# Patient Record
Sex: MALE | Race: WHITE | NOT HISPANIC OR LATINO | URBAN - METROPOLITAN AREA
[De-identification: names, ages, dates, MRNs, and addresses within clinical notes are randomized per-mention and may not be internally consistent; named-entity substitution may affect disease eponyms.]

---

## 2024-08-23 ENCOUNTER — HOSPITAL ENCOUNTER (OUTPATIENT)
Dept: RADIOLOGY | Facility: HOSPITAL | Age: 44
Discharge: HOME | End: 2024-08-23
Payer: COMMERCIAL

## 2024-08-23 DIAGNOSIS — M25.511 PAIN IN RIGHT SHOULDER: ICD-10-CM

## 2024-08-23 PROCEDURE — 73030 X-RAY EXAM OF SHOULDER: CPT | Mod: RT

## 2024-09-16 ENCOUNTER — APPOINTMENT (OUTPATIENT)
Dept: ORTHOPEDIC SURGERY | Facility: HOSPITAL | Age: 44
End: 2024-09-16
Payer: COMMERCIAL

## 2024-09-18 ENCOUNTER — APPOINTMENT (OUTPATIENT)
Dept: ORTHOPEDIC SURGERY | Facility: CLINIC | Age: 44
End: 2024-09-18
Payer: COMMERCIAL

## 2024-09-18 DIAGNOSIS — M75.81 ROTATOR CUFF TENDINITIS, RIGHT: Primary | ICD-10-CM

## 2024-09-18 DIAGNOSIS — M75.51 BURSITIS OF RIGHT SHOULDER: ICD-10-CM

## 2024-09-18 DIAGNOSIS — M75.41 IMPINGEMENT SYNDROME OF RIGHT SHOULDER: ICD-10-CM

## 2024-09-18 PROCEDURE — 99204 OFFICE O/P NEW MOD 45 MIN: CPT | Performed by: ORTHOPAEDIC SURGERY

## 2024-09-18 NOTE — PROGRESS NOTES
New patient Right shoulder pain x 1 year, original injury was he had his arms above his head pushing wire through a pipe and has had pain and minimal mobility since. Treatment: 40mg Kenalog injection done by his pcp 3 weeks ago

## 2024-09-18 NOTE — PROGRESS NOTES
43 y.o. male presents today for evaluation of right shoulder pain for about a year.. The patient reports he was passing some wire through type and he started developing right shoulder pain.  He recently got an injection from his primary care which he states has helped a lot. Pain is controlled. Patient reports no numbness and tingling.  Reports no previous surgeries or trauma to the area.  Reports pain worse with use, better at rest.   Pain dull ache, sharp at times prior to the injection, but much improved now.  Complains mainly of decreased range of motion of the shoulder minimal pain    Review of Systems    Constitutional: see HPI, no fever, no chills, not feeling tired, no significant weight gain or weight loss.   Eyes: No vision changes  ENT: no nosebleeds.   Cardiovascular: no chest pain.   Respiratory: no shortness of breath and no cough.   Gastrointestinal: no abdominal pain, no nausea, no vomiting and no diarrhea.   Musculoskeletal: per HPI  Neurological: no headache, no gait disturbances  Psychiatric: no depression and no sleep disturbances.   Endocrine: no muscle weakness and no muscle cramps.   Hematologic/Lymphatic: no swollen glands and no tendency for easy bruising or excessive swelling.     Patient's past medical history, past surgical history, allergies, and medications have been reviewed unless otherwise noted in the chart.     Shoulder:  Inspection:  no evidence of infection, no erythema, no edema, no ecchymosis, Palpation:  compartments are soft  Skin:  intact, Vascular:  capillary refill <2 seconds distally, Sensation:  sensation intact to light touch distally; AROM- Abduction 90, elevation to 165 degrees, ER 90 degrees, IR S1;  NTTP at the biceps tendon,  NTTP at AC joint; NTTP on the lateral deltoid Special-  negative Brown test, Neer's Test, cross body test; negative Empty can test/Drop Arm test;  negative Yergason's/Speed's Test;   negative belly pressed and posterior liftoff      Constitutional   General appearance: Alert and in no acute distress. Well developed, well nourished.    Eyes   External Eye, Conjunctiva and lids: Normal external exam - pupils were equal in size, round, reactive to light (PERRL) with normal accommodation and extraocular movements intact (EOMI).   Ears, Nose, Mouth, and Throat   Hearing: Normal.   Neck   Neck: No neck mass was observed. Supple.   Pulmonary   Respiratory effort: No respiratory distress.   Cardiovascular   Examination of extremities: No peripheral edema.   Psychiatric   Judgment and insight: Intact.   Orientation to person, place, and time: Alert and oriented x 3.       Mood and affect: Normal.      XR - no fractures, no dislocations, or no osseous abnormalities right shoulder    X-rays were personally reviewed by me. Radiology reports were reviewed by me as well, if available at the time.      Right shoulder rotator cuff tendonitis, bursitis and impingement syndrome  Based on the history, physical exam and imaging studies above, the patient's presentation is consistent with the above diagnosis.  I had a long discussion with the patient regarding their presentation and the treatment options.  We discussed initial nonoperative versus operative management options as well as potential further diagnostic imaging.  We again discussed her treatment options going forward along with their associated risks and benefits. After thorough discussion, the patient has elected to proceed with conservative management. All questions were answered to the patients satisfaction who seems satisfied with the plan.  They will call the office with any questions/concerns.    Physical therapy for right shoulder (ROM, strength, etc)  OTC NSAIDs prn  FU 6-8 weeks prn - No XR

## 2024-11-08 ENCOUNTER — APPOINTMENT (OUTPATIENT)
Dept: CARDIOLOGY | Facility: HOSPITAL | Age: 44
End: 2024-11-08
Payer: COMMERCIAL

## 2024-12-03 ENCOUNTER — HOSPITAL ENCOUNTER (OUTPATIENT)
Dept: CARDIOLOGY | Facility: HOSPITAL | Age: 44
Discharge: HOME | End: 2024-12-03
Payer: COMMERCIAL

## 2024-12-03 DIAGNOSIS — R06.09 OTHER FORMS OF DYSPNEA: ICD-10-CM

## 2024-12-03 DIAGNOSIS — R07.89 OTHER CHEST PAIN: ICD-10-CM

## 2024-12-03 PROCEDURE — 93017 CV STRESS TEST TRACING ONLY: CPT

## 2024-12-03 PROCEDURE — 93018 CV STRESS TEST I&R ONLY: CPT | Performed by: INTERNAL MEDICINE

## 2024-12-03 PROCEDURE — 93016 CV STRESS TEST SUPVJ ONLY: CPT | Performed by: INTERNAL MEDICINE

## 2025-03-29 ENCOUNTER — APPOINTMENT (OUTPATIENT)
Dept: RADIOLOGY | Facility: HOSPITAL | Age: 45
End: 2025-03-29
Payer: COMMERCIAL

## 2025-04-12 ENCOUNTER — APPOINTMENT (OUTPATIENT)
Dept: RADIOLOGY | Facility: HOSPITAL | Age: 45
End: 2025-04-12
Payer: COMMERCIAL

## 2025-07-24 ENCOUNTER — HOSPITAL ENCOUNTER (EMERGENCY)
Facility: HOSPITAL | Age: 45
Discharge: HOME | End: 2025-07-24
Payer: COMMERCIAL

## 2025-07-24 ENCOUNTER — OFFICE VISIT (OUTPATIENT)
Dept: URGENT CARE | Age: 45
End: 2025-07-24
Payer: COMMERCIAL

## 2025-07-24 ENCOUNTER — APPOINTMENT (OUTPATIENT)
Dept: RADIOLOGY | Facility: HOSPITAL | Age: 45
End: 2025-07-24
Payer: COMMERCIAL

## 2025-07-24 VITALS
RESPIRATION RATE: 18 BRPM | TEMPERATURE: 98.6 F | SYSTOLIC BLOOD PRESSURE: 123 MMHG | HEART RATE: 93 BPM | WEIGHT: 224.8 LBS | OXYGEN SATURATION: 97 % | DIASTOLIC BLOOD PRESSURE: 89 MMHG

## 2025-07-24 VITALS
WEIGHT: 224 LBS | RESPIRATION RATE: 16 BRPM | HEART RATE: 71 BPM | BODY MASS INDEX: 30.34 KG/M2 | DIASTOLIC BLOOD PRESSURE: 81 MMHG | OXYGEN SATURATION: 97 % | SYSTOLIC BLOOD PRESSURE: 116 MMHG | TEMPERATURE: 97.9 F | HEIGHT: 72 IN

## 2025-07-24 DIAGNOSIS — R10.31 RIGHT LOWER QUADRANT ABDOMINAL PAIN: Primary | ICD-10-CM

## 2025-07-24 DIAGNOSIS — K57.92 DIVERTICULITIS: Primary | ICD-10-CM

## 2025-07-24 DIAGNOSIS — K44.9 HIATAL HERNIA: ICD-10-CM

## 2025-07-24 DIAGNOSIS — N20.0 RENAL CALCULUS, LEFT: ICD-10-CM

## 2025-07-24 LAB
ALBUMIN SERPL BCP-MCNC: 4.3 G/DL (ref 3.4–5)
ALP SERPL-CCNC: 68 U/L (ref 33–120)
ALT SERPL W P-5'-P-CCNC: 29 U/L (ref 10–52)
ANION GAP SERPL CALC-SCNC: 12 MMOL/L (ref 10–20)
AST SERPL W P-5'-P-CCNC: 23 U/L (ref 9–39)
BASOPHILS # BLD AUTO: 0.05 X10*3/UL (ref 0–0.1)
BASOPHILS NFR BLD AUTO: 0.5 %
BILIRUB SERPL-MCNC: 0.7 MG/DL (ref 0–1.2)
BUN SERPL-MCNC: 12 MG/DL (ref 6–23)
CALCIUM SERPL-MCNC: 9.4 MG/DL (ref 8.6–10.3)
CHLORIDE SERPL-SCNC: 106 MMOL/L (ref 98–107)
CO2 SERPL-SCNC: 23 MMOL/L (ref 21–32)
CREAT SERPL-MCNC: 1.1 MG/DL (ref 0.5–1.3)
EGFRCR SERPLBLD CKD-EPI 2021: 84 ML/MIN/1.73M*2
EOSINOPHIL # BLD AUTO: 0.14 X10*3/UL (ref 0–0.7)
EOSINOPHIL NFR BLD AUTO: 1.4 %
ERYTHROCYTE [DISTWIDTH] IN BLOOD BY AUTOMATED COUNT: 12.5 % (ref 11.5–14.5)
GLUCOSE SERPL-MCNC: 115 MG/DL (ref 74–99)
HCT VFR BLD AUTO: 40.2 % (ref 41–52)
HETEROPH AB SERPLBLD QL IA.RAPID: NEGATIVE
HGB BLD-MCNC: 14.2 G/DL (ref 13.5–17.5)
IMM GRANULOCYTES # BLD AUTO: 0.03 X10*3/UL (ref 0–0.7)
IMM GRANULOCYTES NFR BLD AUTO: 0.3 % (ref 0–0.9)
LACTATE SERPL-SCNC: 1.2 MMOL/L (ref 0.4–2)
LIPASE SERPL-CCNC: 21 U/L (ref 9–82)
LYMPHOCYTES # BLD AUTO: 1.67 X10*3/UL (ref 1.2–4.8)
LYMPHOCYTES NFR BLD AUTO: 16.2 %
MAGNESIUM SERPL-MCNC: 1.8 MG/DL (ref 1.6–2.4)
MCH RBC QN AUTO: 31.1 PG (ref 26–34)
MCHC RBC AUTO-ENTMCNC: 35.3 G/DL (ref 32–36)
MCV RBC AUTO: 88 FL (ref 80–100)
MONOCYTES # BLD AUTO: 0.96 X10*3/UL (ref 0.1–1)
MONOCYTES NFR BLD AUTO: 9.3 %
NEUTROPHILS # BLD AUTO: 7.43 X10*3/UL (ref 1.2–7.7)
NEUTROPHILS NFR BLD AUTO: 72.3 %
NRBC BLD-RTO: 0 /100 WBCS (ref 0–0)
PLATELET # BLD AUTO: 200 X10*3/UL (ref 150–450)
POC APPEARANCE, URINE: CLEAR
POC BILIRUBIN, URINE: NEGATIVE
POC BLOOD, URINE: NEGATIVE
POC COLOR, URINE: YELLOW
POC GLUCOSE, URINE: NEGATIVE MG/DL
POC KETONES, URINE: NEGATIVE MG/DL
POC LEUKOCYTES, URINE: NEGATIVE
POC NITRITE,URINE: NEGATIVE
POC PH, URINE: 6 PH
POC PROTEIN, URINE: NEGATIVE MG/DL
POC SPECIFIC GRAVITY, URINE: 1.01
POC UROBILINOGEN, URINE: 0.2 EU/DL
POTASSIUM SERPL-SCNC: 3.6 MMOL/L (ref 3.5–5.3)
PROT SERPL-MCNC: 7.1 G/DL (ref 6.4–8.2)
RBC # BLD AUTO: 4.57 X10*6/UL (ref 4.5–5.9)
SODIUM SERPL-SCNC: 137 MMOL/L (ref 136–145)
WBC # BLD AUTO: 10.3 X10*3/UL (ref 4.4–11.3)

## 2025-07-24 PROCEDURE — 84075 ASSAY ALKALINE PHOSPHATASE: CPT | Performed by: NURSE PRACTITIONER

## 2025-07-24 PROCEDURE — 2500000001 HC RX 250 WO HCPCS SELF ADMINISTERED DRUGS (ALT 637 FOR MEDICARE OP): Performed by: NURSE PRACTITIONER

## 2025-07-24 PROCEDURE — 2550000001 HC RX 255 CONTRASTS: Performed by: NURSE PRACTITIONER

## 2025-07-24 PROCEDURE — 83690 ASSAY OF LIPASE: CPT | Performed by: NURSE PRACTITIONER

## 2025-07-24 PROCEDURE — 99203 OFFICE O/P NEW LOW 30 MIN: CPT

## 2025-07-24 PROCEDURE — 99285 EMERGENCY DEPT VISIT HI MDM: CPT | Mod: 25

## 2025-07-24 PROCEDURE — 81003 URINALYSIS AUTO W/O SCOPE: CPT

## 2025-07-24 PROCEDURE — 2500000004 HC RX 250 GENERAL PHARMACY W/ HCPCS (ALT 636 FOR OP/ED): Performed by: NURSE PRACTITIONER

## 2025-07-24 PROCEDURE — 36415 COLL VENOUS BLD VENIPUNCTURE: CPT | Performed by: NURSE PRACTITIONER

## 2025-07-24 PROCEDURE — 85025 COMPLETE CBC W/AUTO DIFF WBC: CPT | Performed by: NURSE PRACTITIONER

## 2025-07-24 PROCEDURE — 86308 HETEROPHILE ANTIBODY SCREEN: CPT | Performed by: NURSE PRACTITIONER

## 2025-07-24 PROCEDURE — 96361 HYDRATE IV INFUSION ADD-ON: CPT

## 2025-07-24 PROCEDURE — 83605 ASSAY OF LACTIC ACID: CPT | Performed by: NURSE PRACTITIONER

## 2025-07-24 PROCEDURE — 96374 THER/PROPH/DIAG INJ IV PUSH: CPT | Mod: 59

## 2025-07-24 PROCEDURE — 96375 TX/PRO/DX INJ NEW DRUG ADDON: CPT

## 2025-07-24 PROCEDURE — 74177 CT ABD & PELVIS W/CONTRAST: CPT | Performed by: RADIOLOGY

## 2025-07-24 PROCEDURE — 83735 ASSAY OF MAGNESIUM: CPT | Performed by: NURSE PRACTITIONER

## 2025-07-24 PROCEDURE — 74177 CT ABD & PELVIS W/CONTRAST: CPT

## 2025-07-24 RX ORDER — MORPHINE SULFATE 4 MG/ML
4 INJECTION INTRAVENOUS ONCE AS NEEDED
Status: DISCONTINUED | OUTPATIENT
Start: 2025-07-24 | End: 2025-07-25 | Stop reason: HOSPADM

## 2025-07-24 RX ORDER — METRONIDAZOLE 250 MG/1
500 TABLET ORAL ONCE
Status: COMPLETED | OUTPATIENT
Start: 2025-07-24 | End: 2025-07-24

## 2025-07-24 RX ORDER — CEFDINIR 300 MG/1
300 CAPSULE ORAL 2 TIMES DAILY
Qty: 14 CAPSULE | Refills: 0 | Status: SHIPPED | OUTPATIENT
Start: 2025-07-24 | End: 2025-07-31

## 2025-07-24 RX ORDER — KETOROLAC TROMETHAMINE 30 MG/ML
15 INJECTION, SOLUTION INTRAMUSCULAR; INTRAVENOUS ONCE
Status: COMPLETED | OUTPATIENT
Start: 2025-07-24 | End: 2025-07-24

## 2025-07-24 RX ORDER — ONDANSETRON 4 MG/1
4 TABLET, ORALLY DISINTEGRATING ORAL EVERY 8 HOURS PRN
Qty: 15 TABLET | Refills: 0 | Status: SHIPPED | OUTPATIENT
Start: 2025-07-24 | End: 2025-07-29

## 2025-07-24 RX ORDER — CEFDINIR 300 MG/1
300 CAPSULE ORAL ONCE
Status: COMPLETED | OUTPATIENT
Start: 2025-07-24 | End: 2025-07-24

## 2025-07-24 RX ORDER — FAMOTIDINE 10 MG/ML
20 INJECTION, SOLUTION INTRAVENOUS ONCE
Status: COMPLETED | OUTPATIENT
Start: 2025-07-24 | End: 2025-07-24

## 2025-07-24 RX ORDER — METRONIDAZOLE 500 MG/1
500 TABLET ORAL 2 TIMES DAILY
Qty: 14 TABLET | Refills: 0 | Status: SHIPPED | OUTPATIENT
Start: 2025-07-24 | End: 2025-07-31

## 2025-07-24 RX ADMIN — IOHEXOL 75 ML: 350 INJECTION, SOLUTION INTRAVENOUS at 20:17

## 2025-07-24 RX ADMIN — KETOROLAC TROMETHAMINE 15 MG: 30 INJECTION, SOLUTION INTRAMUSCULAR at 19:30

## 2025-07-24 RX ADMIN — METRONIDAZOLE 500 MG: 250 TABLET ORAL at 22:04

## 2025-07-24 RX ADMIN — SODIUM CHLORIDE 1000 ML: 0.9 INJECTION, SOLUTION INTRAVENOUS at 19:25

## 2025-07-24 RX ADMIN — FAMOTIDINE 20 MG: 10 INJECTION, SOLUTION INTRAVENOUS at 19:30

## 2025-07-24 RX ADMIN — CEFDINIR 300 MG: 300 CAPSULE ORAL at 22:05

## 2025-07-24 ASSESSMENT — ENCOUNTER SYMPTOMS
DIARRHEA: 1
DYSURIA: 0
FEVER: 1
ABDOMINAL PAIN: 1
FREQUENCY: 0
NAUSEA: 0
SHORTNESS OF BREATH: 0
CHEST TIGHTNESS: 0
HEMATURIA: 0
APPETITE CHANGE: 0
CHILLS: 1
VOMITING: 0
BACK PAIN: 0
FLANK PAIN: 0
MYALGIAS: 1

## 2025-07-24 ASSESSMENT — LIFESTYLE VARIABLES
EVER FELT BAD OR GUILTY ABOUT YOUR DRINKING: NO
TOTAL SCORE: 0
EVER HAD A DRINK FIRST THING IN THE MORNING TO STEADY YOUR NERVES TO GET RID OF A HANGOVER: NO
HAVE YOU EVER FELT YOU SHOULD CUT DOWN ON YOUR DRINKING: NO
HAVE PEOPLE ANNOYED YOU BY CRITICIZING YOUR DRINKING: NO

## 2025-07-24 ASSESSMENT — VISUAL ACUITY: OU: 1

## 2025-07-24 ASSESSMENT — PAIN - FUNCTIONAL ASSESSMENT: PAIN_FUNCTIONAL_ASSESSMENT: 0-10

## 2025-07-24 ASSESSMENT — PAIN DESCRIPTION - FREQUENCY: FREQUENCY: INTERMITTENT

## 2025-07-24 ASSESSMENT — PAIN SCALES - GENERAL
PAINLEVEL_OUTOF10: 0 - NO PAIN
PAINLEVEL_OUTOF10: 0 - NO PAIN

## 2025-07-24 NOTE — PROGRESS NOTES
Subjective   Patient ID: Rayshawn Haque II is a 44 y.o. male. They present today with a chief complaint of Abdominal Pain (X3days ).    History of Present Illness  44 year old male with abdominal pain for 3-4 days. today he also has had low grade fever at home, body aches, chills. Denies vomiting. Explains that the abdominal pain is not relieved with bowel movements. Has has diarrhea. Denies history of abdominal surgery, still has appendix.       Abdominal Pain  Associated symptoms include diarrhea, a fever and myalgias. Pertinent negatives include no dysuria, frequency, hematuria, nausea or vomiting.       Past Medical History  Allergies as of 07/24/2025    (No Known Allergies)       Prescriptions Prior to Admission[1]     Medical History[2]    Surgical History[3]         Review of Systems  Review of Systems   Constitutional:  Positive for chills and fever. Negative for appetite change.   Respiratory:  Negative for chest tightness and shortness of breath.    Cardiovascular:  Negative for chest pain.   Gastrointestinal:  Positive for abdominal pain and diarrhea. Negative for nausea and vomiting.   Genitourinary:  Negative for dysuria, flank pain, frequency, hematuria, penile discharge, penile pain, penile swelling, scrotal swelling, testicular pain and urgency.   Musculoskeletal:  Positive for myalgias. Negative for back pain.   Skin:  Negative for rash.                                  Objective    Vitals:    07/24/25 1829   BP: 123/89   Pulse: 93   Resp: 18   Temp: 37 °C (98.6 °F)   SpO2: 97%   Weight: 102 kg (224 lb 12.8 oz)     No LMP for male patient.    Physical Exam  Constitutional:       General: He is not in acute distress.     Appearance: He is not toxic-appearing.   HENT:      Head: Normocephalic and atraumatic.      Right Ear: Tympanic membrane and external ear normal.      Left Ear: Tympanic membrane and external ear normal.      Nose: Nose normal.      Mouth/Throat:      Mouth: Mucous membranes are dry.       Pharynx: No oropharyngeal exudate or posterior oropharyngeal erythema.     Eyes:      Extraocular Movements: Extraocular movements intact.      Pupils: Pupils are equal, round, and reactive to light.       Cardiovascular:      Rate and Rhythm: Normal rate and regular rhythm.   Pulmonary:      Effort: Pulmonary effort is normal. No respiratory distress.      Breath sounds: Normal breath sounds. No wheezing.   Abdominal:      General: Abdomen is flat. There is no distension.      Palpations: Abdomen is soft.      Tenderness: There is abdominal tenderness in the right lower quadrant. There is guarding. There is no right CVA tenderness or left CVA tenderness. Positive signs include McBurney's sign.     Neurological:      Mental Status: He is alert.         Procedures    Point of Care Test & Imaging Results from this visit  Results for orders placed or performed in visit on 07/24/25   POCT UA Automated manually resulted   Result Value Ref Range    POC Color, Urine Yellow Straw, Yellow, Light-Yellow    POC Appearance, Urine Clear Clear    POC Glucose, Urine NEGATIVE NEGATIVE mg/dl    POC Bilirubin, Urine NEGATIVE NEGATIVE    POC Ketones, Urine NEGATIVE NEGATIVE mg/dl    POC Specific Gravity, Urine 1.015 1.005 - 1.035    POC Blood, Urine NEGATIVE NEGATIVE    POC PH, Urine 6.0 No Reference Range Established PH    POC Protein, Urine NEGATIVE NEGATIVE mg/dl    POC Urobilinogen, Urine 0.2 0.2, 1.0 EU/DL    Poc Nitrite, Urine NEGATIVE NEGATIVE    POC Leukocytes, Urine NEGATIVE NEGATIVE      Imaging  No results found.    Cardiology, Vascular, and Other Imaging  No other imaging results found for the past 2 days      Diagnostic study results (if any) were reviewed by Ninoska Aparicio PA-C.    Assessment/Plan   Allergies, medications, history, and pertinent labs/EKGs/Imaging reviewed by Ninoska Aparicio PA-C.     Medical Decision Making  MDM- Patient with signs and symptoms consistent with abdominal pain that cannot be  properly assessed in office and is stable. RLQ pain, concern is for appendicitis, bowel obstruction, other acute abdomen. Patient referred to ED for further evaluation and testing. Patient will be transferred via Private Vehicle to [facility], driven by family member. Discharged in stable condition, patient case reviewed with Dr Lobato, who agrees with plan for er transfer.       Orders and Diagnoses  Diagnoses and all orders for this visit:  Right lower quadrant abdominal pain  -     POCT UA Automated manually resulted      Medical Admin Record      Patient disposition: ED. Transported by: Private Vehicle.    Electronically signed by Ninoska Aparicio PA-C  6:47 PM           [1] (Not in a hospital admission)   [2]   Past Medical History:  Diagnosis Date    Dorsalgia, unspecified     Pain, upper back    Hypoactive sexual desire disorder 10/22/2014    Low sexual desire disorder    Opioid dependence, uncomplicated (Multi)     Opioid dependence    Opioid dependence, uncomplicated (Multi) 12/30/2016    Opioid type dependence, continuous use    Other conditions influencing health status     Generalized Anxiety Disorder    Personal history of other mental and behavioral disorders 12/29/2016    History of depression    Personal history of other specified conditions     History of insomnia    Personal history of other specified conditions 10/22/2014    History of fatigue   [3]   Past Surgical History:  Procedure Laterality Date    OTHER SURGICAL HISTORY  05/15/2013    Open Chest Coronary Artery Angioplasty

## 2025-07-24 NOTE — Clinical Note
Rayshawn Haque was seen and treated in our emergency department on 7/24/2025.  He may return to work on 07/27/2025.       If you have any questions or concerns, please don't hesitate to call.      Zofia Friedman, APRN-CNP

## 2025-07-24 NOTE — ED PROVIDER NOTES
HPI   Chief Complaint   Patient presents with    Abdominal Pain    Sore Throat       Patient presents emergency department for evaluation of abdominal pain.  Patient states his pain started on Monday, 4 days ago.  He had some swollen glands up in his neck and a sore throat which his tonsils were touching.  That has since improved however he has had persistence of some abdominal pain.  It was like a gas pain that is intermittently sharp.  He has had nausea but without vomiting.  He did not necessarily have any constipation but has since developed multiple episodes of soft brown stool on Tuesday and then has had watery diarrhea of a couple episodes today.  There is never been any black or bloody stool.  His abdominal pain is throughout the lower abdomen now and he was sent from urgent care for rule out appendicitis.  He has no prior surgical history of the abdomen nor does he have a history of peptic ulcer disease, pancreatitis, liver disease, gallbladder disease, diverticulitis, ureteral calculus or colitis.  He took occasional Tums which he usually does for acidity with no change.  He does not endorse any syncope, earache, chest pain, palpitations, shortness of breath, cough, vomiting, dysuria, hematuria, urinary frequency, urgency, testicular pain, leg swelling or rash.  The temperature of 99.6 axillary and bodyaches with persistence of his abdominal pain is what took him to urgent care today.  He was sent to the emergency department to rule out appendicitis.  He works as an  and denies any known exposure to COVID, strep throat, mononucleosis or sick contacts and he has had no recent travel, antibiotic use or suspicious food intake.      History provided by:  Patient   used: No                          Christy Coma Scale Score: 15                  Patient History   Medical History[1]  Surgical History[2]  Family History[3]  Social History[4]    Physical Exam   Visit Vitals  BP  116/81 (BP Location: Left arm, Patient Position: Sitting)   Pulse 71   Temp 36.6 °C (97.9 °F)   Resp 16   Ht 1.829 m (6')   Wt 102 kg (224 lb)   SpO2 97%   BMI 30.38 kg/m²   BSA 2.28 m²      Physical Exam  Vitals reviewed.   Constitutional:       General: He is not in acute distress.     Appearance: He is not toxic-appearing.   HENT:      Head: Normocephalic and atraumatic.      Right Ear: Hearing and external ear normal.      Left Ear: Hearing and external ear normal.      Nose: Nose normal. No congestion or rhinorrhea.      Mouth/Throat:      Lips: Pink.      Mouth: Mucous membranes are dry.      Pharynx: Oropharynx is clear. No posterior oropharyngeal erythema or uvula swelling.      Tonsils: 0 on the right. 0 on the left.     Eyes:      General: Vision grossly intact. No scleral icterus.    Neck:      Trachea: Phonation normal.     Cardiovascular:      Rate and Rhythm: Normal rate and regular rhythm.      Pulses:           Radial pulses are 2+ on the left side.        Posterior tibial pulses are 2+ on the right side and 2+ on the left side.      Heart sounds: No murmur heard.     Comments: No resting tachycardia. No calf tenderness, palpable cords or localized foot/ankle edema bilaterally.   Pulmonary:      Effort: Pulmonary effort is normal.      Breath sounds: Normal breath sounds. No wheezing or rhonchi.   Abdominal:      General: Bowel sounds are normal.      Palpations: Abdomen is soft.      Tenderness: There is abdominal tenderness in the right lower quadrant, periumbilical area, suprapubic area and left lower quadrant. There is guarding. There is no right CVA tenderness or left CVA tenderness. Negative signs include Lopez's sign, Rovsing's sign and McBurney's sign.   Genitourinary:     Comments:  exam deferred    Musculoskeletal:      Cervical back: Normal range of motion and neck supple.      Right lower leg: No edema.      Left lower leg: No edema.      Comments: Moves all extremities randomly.  Neurovascularly intact.    Lymphadenopathy:      Cervical: Cervical adenopathy present.      Right cervical: Superficial cervical adenopathy present. No posterior cervical adenopathy.     Left cervical: Superficial cervical adenopathy present. No posterior cervical adenopathy.     Skin:     General: Skin is warm and dry.      Capillary Refill: Capillary refill takes less than 2 seconds.      Coloration: Skin is not cyanotic, jaundiced or pale.     Neurological:      Mental Status: He is alert and oriented to person, place, and time.      Sensory: Sensation is intact.      Motor: Motor function is intact.      Coordination: Coordination is intact.      Gait: Gait is intact.     Psychiatric:         Mood and Affect: Mood normal.         Behavior: Behavior is cooperative.         CT abdomen pelvis w IV contrast   Final Result   1.  Features of sigmoid diverticulitis. No evidence of perforation.   Correlation with any known colonoscopy results. No free air. No no   fluid collections.   2. Non-obstructing nephroliths seen in the left             MACRO:   None        Signed by: Lalito Thomas 7/24/2025 9:20 PM   Dictation workstation:   [id number]          Labs Reviewed   CBC WITH AUTO DIFFERENTIAL - Abnormal       Result Value    WBC 10.3      nRBC 0.0      RBC 4.57      Hemoglobin 14.2      Hematocrit 40.2 (*)     MCV 88      MCH 31.1      MCHC 35.3      RDW 12.5      Platelets 200      Neutrophils % 72.3      Immature Granulocytes %, Automated 0.3      Lymphocytes % 16.2      Monocytes % 9.3      Eosinophils % 1.4      Basophils % 0.5      Neutrophils Absolute 7.43      Immature Granulocytes Absolute, Automated 0.03      Lymphocytes Absolute 1.67      Monocytes Absolute 0.96      Eosinophils Absolute 0.14      Basophils Absolute 0.05     COMPREHENSIVE METABOLIC PANEL - Abnormal    Glucose 115 (*)     Sodium 137      Potassium 3.6      Chloride 106      Bicarbonate 23      Anion Gap 12      Urea Nitrogen 12       Creatinine 1.10      eGFR 84      Calcium 9.4      Albumin 4.3      Alkaline Phosphatase 68      Total Protein 7.1      AST 23      Bilirubin, Total 0.7      ALT 29     LIPASE - Normal    Lipase 21      Narrative:     Venipuncture immediately after or during the administration of Metamizole may lead to falsely low results. Testing should be performed immediately prior to Metamizole dosing.   LACTATE - Normal    Lactate 1.2      Narrative:     Venipuncture immediately after or during the administration of Metamizole may lead to falsely low results. Testing should be performed immediately prior to Metamizole dosing.   MAGNESIUM - Normal    Magnesium 1.80     MONONUCLEOSIS SCREEN (HETEROPHILE ANTIBODY) - Normal    Mononucleosis Screen Negative         No results found for this or any previous visit (from the past 4464 hours).    ED Course & MDM     Medical Decision Making  Patient presents the emergency department for evaluation of abdominal pain.  Differential diagnosis of diverticulitis, appendicitis, mononucleosis and enteritis to mention a few.  Plan is for baseline labs, lactate, lipase, mononucleosis and CT imaging of the abdomen pelvis with IV contrast as urgent care referred him for rule out appendicitis and he would like to proceed with imaging.  Will provide a liter of IV fluid and symptom management of Toradol and Pepcid with morphine as needed for breakthrough pain.  Patient provides consent for this plan.        ED Course as of 07/24/25 2202   Thu Jul 24, 2025 1959 WBC: 10.3 [NA]   1959 Creatinine: 1.10 [NA]   2000 MAGNESIUM: 1.80 [NA]   2000 Lactate: 1.2 [NA]   2000 LIPASE: 21 [NA]   2000 Mononucleosis Screen: Negative [NA]   2149 IMPRESSION:  1.  Features of sigmoid diverticulitis. No evidence of perforation.  Correlation with any known colonoscopy results. No free air. No no fluid collections.  2. Non-obstructing nephroliths seen in the left       [NA]   2158 Patient remains comfortable with no emesis  and we discussed CT results at length including incidental findings.  Plan is for dose of Omnicef and Flagyl in the ED and outpatient management of diverticulitis.  Plan is for cefdinir 300 mg twice daily for 7 days, metronidazole 500 mg twice daily for 7 days, Zofran as needed along with over-the-counter Tylenol or ibuprofen as needed.  Patient understands he is not to drink alcohol with the metronidazole.  We discussed strict return precautions which he verbalizes understanding and will provide referral for GI for colonoscopy once treated and consideration of EGD given his small hiatal hernia. Patient is non-toxic, not hypoxic and appropriate for this outpatient management plan which they prefer. Encouragement to arrange close follow up was discussed as well as provided in a written handout of discharge instructions. Patient was educated on signs of symptoms to watch for indicative of re-evaluation in the emergency department setting to include any worsening of current symptoms. They verbalized understanding of instructions and is amenable to this treatment plan with no social determinants of health that would obscure this plan. Patient departed in stable condition.  [NA]      ED Course User Index  [NA] Zofia Friedman, APRN-CNP         Diagnoses as of 07/24/25 2202   Diverticulitis   Hiatal hernia   Renal calculus, left          Your medication list        START taking these medications        Instructions Last Dose Given Next Dose Due   cefdinir 300 mg capsule  Commonly known as: Omnicef      Take 1 capsule (300 mg) by mouth 2 times a day for 7 days.       metroNIDAZOLE 500 mg tablet  Commonly known as: Flagyl      Take 1 tablet (500 mg) by mouth 2 times a day for 7 days.       ondansetron ODT 4 mg disintegrating tablet  Commonly known as: Zofran-ODT      Dissolve 1 tablet (4 mg) in the mouth every 8 hours if needed for nausea or vomiting for up to 5 days.                 Where to Get Your Medications         These medications were sent to [facility] [address] [place] [address], [place] [address]  [address] [facility] [address]      Phone: [phone]   cefdinir 300 mg capsule  metroNIDAZOLE 500 mg tablet  ondansetron ODT 4 mg disintegrating tablet         Procedure  None     *This report was transcribed using voice recognition software.  Every effort was made to ensure accuracy; however, inadvertent computerized transcription errors may be present.*  JUAN CARLOS Sosa  07/24/25           [1]   Past Medical History:  Diagnosis Date    Dorsalgia, unspecified     Pain, upper back    Hypoactive sexual desire disorder 10/22/2014    Low sexual desire disorder    Opioid dependence, uncomplicated (Multi)     Opioid dependence    Opioid dependence, uncomplicated (Multi) 12/30/2016    Opioid type dependence, continuous use    Other conditions influencing health status     Generalized Anxiety Disorder    Personal history of other mental and behavioral disorders 12/29/2016    History of depression    Personal history of other specified conditions     History of insomnia    Personal history of other specified conditions 10/22/2014    History of fatigue   [2]   Past Surgical History:  Procedure Laterality Date    OTHER SURGICAL HISTORY  05/15/2013    Open Chest Coronary Artery Angioplasty   [3] No family history on file.  [4]   Social History  Tobacco Use    Smoking status: Not on file    Smokeless tobacco: Not on file   Substance Use Topics    Alcohol use: Not on file    Drug use: Not on file        ROBERTO Sosa-MINI  07/24/25 [id number]

## 2025-07-24 NOTE — ED TRIAGE NOTES
Pt states Monday he had swollen glands and felt like his tonsils were touching. He also has abdominal pain. Had a fever today and went to  who sent him in to R/O appendicitis. He has had nausea. Denies vomiting. Had some diarrhea. No sick contacts.

## 2025-09-05 ENCOUNTER — APPOINTMENT (OUTPATIENT)
Facility: CLINIC | Age: 45
End: 2025-09-05
Payer: COMMERCIAL

## 2025-09-05 PROBLEM — F11.21 OPIOID TYPE DEPENDENCE IN REMISSION (MULTI): Status: ACTIVE | Noted: 2025-09-05

## 2025-09-05 PROBLEM — M54.30 SCIATICA: Status: ACTIVE | Noted: 2025-09-05

## 2025-09-05 PROBLEM — I10 HYPERTENSION: Status: ACTIVE | Noted: 2025-09-05

## 2025-09-05 PROBLEM — D22.30 ATYPICAL NEVUS OF FACE: Status: ACTIVE | Noted: 2025-09-05

## 2025-09-05 PROBLEM — E78.5 HYPERLIPIDEMIA, UNSPECIFIED: Status: ACTIVE | Noted: 2022-09-06

## 2025-09-05 PROBLEM — M54.16 LUMBAR RADICULOPATHY: Status: ACTIVE | Noted: 2022-10-03

## 2025-09-05 PROBLEM — M54.50 LOW BACK PAIN, UNSPECIFIED: Status: ACTIVE | Noted: 2022-09-06

## 2025-09-05 PROBLEM — K21.9 GASTRO-ESOPHAGEAL REFLUX DISEASE WITHOUT ESOPHAGITIS: Status: ACTIVE | Noted: 2018-10-14

## 2025-09-05 ASSESSMENT — ENCOUNTER SYMPTOMS
SHORTNESS OF BREATH: 0
ARTHRALGIAS: 0
DIZZINESS: 0
DYSURIA: 0
PALPITATIONS: 0
ROS GI COMMENTS: AS DETAILED ABOVE.
CHILLS: 0
HEMATURIA: 0
WEAKNESS: 0
WHEEZING: 0
VOICE CHANGE: 0
SEIZURES: 0
ADENOPATHY: 0
CONFUSION: 0
MYALGIAS: 0
FATIGUE: 0
BRUISES/BLEEDS EASILY: 0
UNEXPECTED WEIGHT CHANGE: 0
NUMBNESS: 0
HEADACHES: 0
FEVER: 0
NERVOUS/ANXIOUS: 0
SORE THROAT: 0
TROUBLE SWALLOWING: 0
COUGH: 0